# Patient Record
Sex: MALE | Race: WHITE
[De-identification: names, ages, dates, MRNs, and addresses within clinical notes are randomized per-mention and may not be internally consistent; named-entity substitution may affect disease eponyms.]

---

## 2018-01-07 ENCOUNTER — HOSPITAL ENCOUNTER (INPATIENT)
Dept: HOSPITAL 74 - JER | Age: 33
LOS: 4 days | Discharge: HOME | DRG: 872 | End: 2018-01-11
Attending: INTERNAL MEDICINE | Admitting: INTERNAL MEDICINE
Payer: COMMERCIAL

## 2018-01-07 VITALS — BODY MASS INDEX: 27.1 KG/M2

## 2018-01-07 DIAGNOSIS — M43.07: ICD-10-CM

## 2018-01-07 DIAGNOSIS — N45.3: ICD-10-CM

## 2018-01-07 DIAGNOSIS — N43.3: ICD-10-CM

## 2018-01-07 DIAGNOSIS — A41.9: Primary | ICD-10-CM

## 2018-01-07 DIAGNOSIS — I86.1: ICD-10-CM

## 2018-01-07 LAB
ALBUMIN SERPL-MCNC: 4.1 G/DL (ref 3.4–5)
ALP SERPL-CCNC: 70 U/L (ref 45–117)
ALT SERPL-CCNC: 39 U/L (ref 12–78)
ANION GAP SERPL CALC-SCNC: 11 MMOL/L (ref 8–16)
APPEARANCE UR: CLEAR
AST SERPL-CCNC: 26 U/L (ref 15–37)
BASOPHILS # BLD: 0.4 % (ref 0–2)
BILIRUB SERPL-MCNC: 0.7 MG/DL (ref 0.2–1)
BILIRUB UR STRIP.AUTO-MCNC: NEGATIVE MG/DL
BUN SERPL-MCNC: 19 MG/DL (ref 7–18)
CALCIUM SERPL-MCNC: 9 MG/DL (ref 8.5–10.1)
CHLORIDE SERPL-SCNC: 103 MMOL/L (ref 98–107)
CO2 SERPL-SCNC: 24 MMOL/L (ref 21–32)
COLOR UR: (no result)
CREAT SERPL-MCNC: 1.1 MG/DL (ref 0.7–1.3)
DEPRECATED RDW RBC AUTO: 12.6 % (ref 11.9–15.9)
EOSINOPHIL # BLD: 0.5 % (ref 0–4.5)
GLUCOSE SERPL-MCNC: 90 MG/DL (ref 74–106)
HCG UR QL: NEGATIVE
HCT VFR BLD CALC: 42.6 % (ref 35.4–49)
HGB BLD-MCNC: 14.3 GM/DL (ref 11.7–16.9)
KETONES UR QL STRIP: NEGATIVE
LEUKOCYTE ESTERASE UR QL STRIP.AUTO: NEGATIVE
LYMPHOCYTES # BLD: 14 % (ref 8–40)
MCH RBC QN AUTO: 30.6 PG (ref 25.7–33.7)
MCHC RBC AUTO-ENTMCNC: 33.5 G/DL (ref 32–35.9)
MCV RBC: 91.6 FL (ref 80–96)
MONOCYTES # BLD AUTO: 6.6 % (ref 3.8–10.2)
NEUTROPHILS # BLD: 78.5 % (ref 42.8–82.8)
NITRITE UR QL STRIP: NEGATIVE
PH UR: 5 [PH] (ref 5–8)
PLATELET # BLD AUTO: 329 K/MM3 (ref 134–434)
PMV BLD: 8.2 FL (ref 7.5–11.1)
POTASSIUM SERPLBLD-SCNC: 3.9 MMOL/L (ref 3.5–5.1)
PROT SERPL-MCNC: 7.9 G/DL (ref 6.4–8.2)
PROT UR QL STRIP: NEGATIVE
PROT UR QL STRIP: NEGATIVE
RBC # BLD AUTO: 4.65 M/MM3 (ref 4–5.6)
RBC # UR STRIP: NEGATIVE /UL
SODIUM SERPL-SCNC: 138 MMOL/L (ref 136–145)
SP GR UR: 1.02 (ref 1–1.03)
UROBILINOGEN UR STRIP-MCNC: NEGATIVE MG/DL (ref 0.2–1)
WBC # BLD AUTO: 13.1 K/MM3 (ref 4–10)

## 2018-01-07 PROCEDURE — G0378 HOSPITAL OBSERVATION PER HR: HCPCS

## 2018-01-07 NOTE — PDOC
History of Present Illness





- History of Present Illness


Initial Comments: 





01/07/18 20:11


The patient is a 32 year old male, with a significant past medical history of 

frequent UTIs, who presents to the emergency department with increased swelling 

to his left testicle despite completing a 10 day course of Ciprofloxacin 

yesterday for UTI diagnosed 12 days ago. The patient states the swelling is 

much worse now than it was when he first was seen 12 days ago for left groin 

pain and left testicular swelling. He denies recently sexual activities. He 

denies penile discharge. Secondarily, he mentions he has a urologist at [facility] 

for 15 years with a history of 5 prior CT scans for his frequent UTIs. He 

states he has been told he has prostatitis. 





He denies chest pain, shortness of breath, headache and dizziness. He denies 

fever, chills, nausea, vomit, diarrhea and constipation. He denies dysuria, 

frequency, urgency and hematuria. 





Allergies: NKDA


Urologist: Dr. Valentin Tomas ([phone])








<Lynette Philip - Last Filed: 01/07/18 22:49>





<Aundrea Granados - Last Filed: 01/08/18 00:39>





- General


Chief Complaint: Back Pain


Stated Complaint: PAIN


Time Seen by Provider: 01/07/18 19:23





Past History





<Lynette Philip - Last Filed: 01/07/18 22:49>





- Immunization History


Immunization Up to Date: Yes





- Suicide/Smoking/Psychosocial Hx


Smoking Status: No


Smoking History: Never smoked


Number of Cigarettes Smoked Daily: 0


Hx Alcohol Use: No (rarely)





<Aundrea Granados - Last Filed: 01/08/18 00:39>





- Past Medical History


Allergies/Adverse Reactions: 


 Allergies











Allergy/AdvReac Type Severity Reaction Status Date / Time


 


No Known Allergies Allergy   Verified 08/29/16 13:51











Home Medications: 


Ambulatory Orders





Sulfamethoxazole/Trimethoprim [Bactrim *Ds*] 1 each PO BID #14 tablet 08/29/16 


Doxycycline Hyclate 100 mg PO BID #14 capsule 01/07/18 











**Review of Systems





- Review of Systems


Able to Perform ROS?: Yes


Comments:: 


01/07/18 20:12





GENERAL/CONSTITUTIONAL: No fever or chills. No weakness.


HEAD, EYES, EARS, NOSE AND THROAT: No change in vision. No ear pain or 

discharge. No sore throat.


CARDIOVASCULAR: No chest pain or shortness of breath.


RESPIRATORY: No cough, wheezing, or hemoptysis.


GASTROINTESTINAL: No nausea, vomiting, diarrhea or constipation.


GENITOURINARY: (+) increased left testicular swelling. No dysuria, frequency, 

or change in urination. No discharge.


MUSCULOSKELETAL: No joint or muscle swelling or pain. No neck or back pain.


SKIN: No rash


NEUROLOGIC: No headache, vertigo, loss of consciousness, or change in strength/

sensation.


ENDOCRINE: No increased thirst. No abnormal weight change.


HEMATOLOGIC/LYMPHATIC: No anemia, easy bleeding, or history of blood clots.


ALLERGIC/IMMUNOLOGIC: No hives or skin allergy.








<Lynette Philip - Last Filed: 01/07/18 22:49>





*Physical Exam





- Vital Signs


 Last Vital Signs











Temp Pulse Resp BP Pulse Ox


 


 100.4 F H  95 H  20   131/72   99 


 


 01/07/18 19:10  01/07/18 19:10  01/07/18 19:10  01/07/18 19:10  01/07/18 19:10














- Physical Exam


Comments: 





01/07/18 20:12





GENERAL: Awake, alert, and fully oriented, in no acute distress


HEAD: No signs of trauma


EYES: PERRLA, EOMI, sclera anicteric, conjunctiva clear


ENT: Auricles normal inspection, hearing grossly normal, nares  patent, 

oropharynx clear without


exudates. Moist mucosa


NECK: Normal ROM, supple, no lymphadenopathy, JVD, or masses


LUNGS: Breath sounds equal, clear to auscultation bilaterally.  No wheezes, and 

no crackles


HEART: Regular rate and rhythm, normal S1 and S2, no murmurs, rubs or gallops


ABDOMEN: Soft, nontender, normoactive bowel sounds.  No guarding, no rebound.  

No masses


GENITAL: (+) left epididymis 2x size of the right testicle, left testicle is 3x 

size of right testicle. Left testicle is tender, warm and erythematous. No 

inguinal hernia appreciated. 


EXTREMITIES: Normal range of motion, no edema.  No clubbing or cyanosis. No 

cords, erythema, or tenderness


NEUROLOGICAL: Cranial nerves II through XII grossly intact.  Normal speech, 

normal gait


SKIN: Warm, Dry, normal turgor, no rashes or lesions noted.








<Lynette Philip - Last Filed: 01/07/18 22:49>





- Vital Signs


 Last Vital Signs











Temp Pulse Resp BP Pulse Ox


 


 100.4 F H  95 H  20   131/72   99 


 


 01/07/18 19:10  01/07/18 19:10  01/07/18 19:10  01/07/18 19:10  01/07/18 19:10














<Aundrea Granados - Last Filed: 01/08/18 00:39>





ED Treatment Course





- LABORATORY


CBC & Chemistry Diagram: 


 01/07/18 20:17





 01/07/18 20:17





- ADDITIONAL ORDERS


Additional order review: 


 Laboratory  Results











  01/07/18





  19:41


 


Urine HCG, Qual  Negative














- RADIOLOGY


Radiograph Interpretation: 





EXAM: CT abdomen \T\ pelvis without contrast


HISTORY: Rule out kidney stone


COMPARISON: None.


FINDINGS:


1. No evidence of an acute intra-abdominal process.


No evidence of hydronephrosis or urinary tract calculi.


No evidence of bowel obstruction, pneumoperitoneum or free fluid.


No evidence of appendicitis.


2. Marked degenerative disc and endplate change L5-S1 level with bilateral L5 

spondylolysis.





Andra Briggs MD


01/07/2018 22:26 EST





________________________________________________________________________________





EXAM: Ultrasound scrotum


HISTORY: Swelling on left


COMPARISON: None.


FINDINGS:


1. Edema and increased flow in the left epididymis consistent with left 

epididymitis.


2. Small complex left hydrocele with septations.


3. No definite evidence of orchitis and no evidence of testicular torsion.





Andra Briggs MD


01/07/2018 22:28 EST








- Medications


Given in the ED: 


ED Medications














Discontinued Medications














Generic Name Dose Route Start Last Admin





  Trade Name Freq  PRN Reason Stop Dose Admin


 


Ibuprofen  600 mg  01/07/18 19:28  01/07/18 19:56





  Motrin -  PO  01/07/18 19:29  600 mg





  ONCE ONE   Administration














<Lynette Philip - Last Filed: 01/07/18 22:49>





- LABORATORY


CBC & Chemistry Diagram: 


 01/07/18 20:17





 01/07/18 20:17





<Aundrea Granados - Last Filed: 01/08/18 00:39>





Medical Decision Making





- Medical Decision Making


01/07/18 22:45


Dr. Valentin Tomas, [facility] Urologist, was paged via phone answering service at 

this time requesting a call back for doctor to doctor consult. 





<Lynette Philip - Last Filed: 01/07/18 22:49>





- Medical Decision Making





01/07/18 19:58


Pt has worsening swelling of his left scrotum. He has a  fever.  He was 

diagnosed at [facility] with a UTI he was treated with a shot and cipro pills.


01/07/18 22:16


CXR is normal.


UA is normal; 


Pt has an elevated WBC count.


Sono pending.


01/07/18 23:12


Case d/w Dr. Tomas who recommends admission vs a dose of ampicillin and 

gentamicin IVPB now; home with doxy BID and follow in the office tomorrrow


01/08/18 00:16


Pt figured out that he completed a 10 day course of doxycycline and a 7 day 

course of cipro just a couple montoya back completed both. Given that he just 

completed a course of oral doxy, I feel that sending raúl out with doxy will not 

be helpful.


I will discuss with our urologist on call and admit patient to the hospitalist 

team for scrotal cellulitis/epididymitis/septated varicocele;


01/08/18 00:21


Dr. Glez urology agrees and will see the patient tomorrow.


I will admit to hospitalist.





<Aundrea Granados - Last Filed: 01/08/18 00:39>





*DC/Admit/Observation/Transfer





- Attestations


Scribe Attestion: 





01/07/18 20:13





Documentation prepared by Lynette Philip, acting as medical scribe for Aundrea Granados MD





<Lynette Philip - Last Filed: 01/07/18 22:49>





- Discharge Dispostion


Admit: Yes





<Aundrea Granados - Last Filed: 01/08/18 00:39>


Diagnosis at time of Disposition: 


 Fever, Epididymitis, Varicocele, Cellulitis of scrotum








- Discharge Dispostion


Condition at time of disposition: Guarded





- Prescriptions


Prescriptions: 


Doxycycline Hyclate 100 mg PO BID #14 capsule





- Referrals


Referrals: 


Rajeev Alcantara MD [Primary Care Provider] - 





- Patient Instructions





- Post Discharge Activity

## 2018-01-08 LAB
ALBUMIN SERPL-MCNC: 3.7 G/DL (ref 3.4–5)
ALP SERPL-CCNC: 62 U/L (ref 45–117)
ALT SERPL-CCNC: 35 U/L (ref 12–78)
ANION GAP SERPL CALC-SCNC: 7 MMOL/L (ref 8–16)
AST SERPL-CCNC: 24 U/L (ref 15–37)
BASOPHILS # BLD: 0.4 % (ref 0–2)
BILIRUB SERPL-MCNC: 1.4 MG/DL (ref 0.2–1)
BUN SERPL-MCNC: 13 MG/DL (ref 7–18)
CALCIUM SERPL-MCNC: 8.3 MG/DL (ref 8.5–10.1)
CHLORIDE SERPL-SCNC: 105 MMOL/L (ref 98–107)
CO2 SERPL-SCNC: 26 MMOL/L (ref 21–32)
CREAT SERPL-MCNC: 1 MG/DL (ref 0.7–1.3)
DEPRECATED RDW RBC AUTO: 12.3 % (ref 11.9–15.9)
EOSINOPHIL # BLD: 0.1 % (ref 0–4.5)
GLUCOSE SERPL-MCNC: 85 MG/DL (ref 74–106)
HCT VFR BLD CALC: 41 % (ref 35.4–49)
HGB BLD-MCNC: 13.6 GM/DL (ref 11.7–16.9)
LYMPHOCYTES # BLD: 12 % (ref 8–40)
MCH RBC QN AUTO: 30.4 PG (ref 25.7–33.7)
MCHC RBC AUTO-ENTMCNC: 33.2 G/DL (ref 32–35.9)
MCV RBC: 91.5 FL (ref 80–96)
MONOCYTES # BLD AUTO: 8.6 % (ref 3.8–10.2)
NEUTROPHILS # BLD: 78.9 % (ref 42.8–82.8)
PLATELET # BLD AUTO: 318 K/MM3 (ref 134–434)
PMV BLD: 8.3 FL (ref 7.5–11.1)
POTASSIUM SERPLBLD-SCNC: 4.1 MMOL/L (ref 3.5–5.1)
PROT SERPL-MCNC: 7.2 G/DL (ref 6.4–8.2)
RBC # BLD AUTO: 4.48 M/MM3 (ref 4–5.6)
SODIUM SERPL-SCNC: 138 MMOL/L (ref 136–145)
WBC # BLD AUTO: 14.3 K/MM3 (ref 4–10)

## 2018-01-08 RX ADMIN — SODIUM CHLORIDE SCH MLS/HR: 9 INJECTION, SOLUTION INTRAVENOUS at 21:01

## 2018-01-08 RX ADMIN — CEFEPIME HYDROCHLORIDE SCH MLS/HR: 2 INJECTION, POWDER, FOR SOLUTION INTRAVENOUS at 18:39

## 2018-01-08 RX ADMIN — SODIUM CHLORIDE SCH MLS/HR: 9 INJECTION, SOLUTION INTRAVENOUS at 04:37

## 2018-01-08 RX ADMIN — ACETAMINOPHEN PRN MG: 325 TABLET ORAL at 17:34

## 2018-01-08 RX ADMIN — CEFEPIME HYDROCHLORIDE SCH MLS/HR: 2 INJECTION, POWDER, FOR SOLUTION INTRAVENOUS at 11:53

## 2018-01-08 RX ADMIN — ACETAMINOPHEN PRN MG: 325 TABLET ORAL at 10:02

## 2018-01-08 NOTE — PN
Progress Note (short form)





- Note


Progress Note: 





ID Consult dictated


Acute L epididymitis


Hx chronic recurrent UTIs


Fever/ Leukocytosis R/O sepsis secondary to  source


Await cultures


Obtain urine c/s result from outpatient visit


Empiric cefepime 2gm IVPB q8h


Urology evaluation


Quantiferon

## 2018-01-08 NOTE — CONS
DATE OF CONSULTATION:  

 

CHIEF COMPLAINT:  Patient evaluated for acute left epididymis.

 

HISTORY:  The patient has a long and complicated urological history.  He reports

having epididymitis at age 9.  For the past 10 years, he has been under the care of

a urologist at [place] for recurrent urinary tract infections.  He reports extensive

workup including several CAT scans.  Cystoscopies have been negative for underlying

anatomical defect or etiology for his recurrent urinary tract infections.  For the

past 1 year, he has been seeing a urologist at [facility].  Approximately 2 weeks ago,

he developed left testicular pain and swelling.  He was evaluated in the urgent care

center.  He reports that cultures were obtained.  He was empirically prescribed

ciprofloxacin and doxycycline.  Despite taking the ciprofloxacin for 7 days and

doxycycline for 10 days, he has had increased pain and swelling of the left scrotal

area.  He presented to the emergency room where he was noted to have low-grade fever

and an elevated white blood cell count.  Cultures were obtained.  He was empirically

treated with IV antibiotics.  A sonogram of the scrotum was performed and showed a

left epididymitis with a complex left hydrocele.  No evidence of orchitis was noted. 

The patient lives at home with his wife.  He is sexually active with her.  He is

monogamous.  Does not use condoms.  He has no history of sexually transmitted

diseases.  He was [place] in the [place] and has been a lifelong resident.  No history of

TB exposure.  The patient denies any dysuria or hematuria at the present time.  He

did not have fever or chills at home, although he was febrile in the emergency room. 

He reports that his urine cultures have consistently grown Escherichia coli.  The

only culture result available to us at this time is a urine culture from 2015, which

showed an ampicillin and quinolone-resistant Escherichia coli.

 

PAST MEDICAL HISTORY:  As above includes frequent urinary tract infections and a

history of prostatitis.

 

PAST SURGICAL HISTORY:  Negative.

 

ALLERGIES:  No known allergies.

 

SOCIAL HISTORY:  He is .  Lives at home with his wife.  Monogamous.  He denies

tobacco, alcohol, or illicit drugs.  He works as a doorman.  

 

SYSTEMS REVIEW:

Neurologic:  No loss of consciousness, seizure activity, or focal weakness.

Cardiac:  Negative chest pain or palpitations.

Respiratory:  Negative cough or sputum production.  

Gastrointestinal:  Negative vomiting or diarrhea.

Genitourinary:  As per HPI.  

 

LABORATORY DATA:  White count 14.3, hematocrit 41, platelet count 318, differential

78 neutrophils, 12 lymphocytes, 8 monocytes, BUN 13, creatinine 1.  Urine:  Leukocyte

esterase negative.  Urine culture pending.  Total bilirubin 1.4, alkaline phosphatase

62, AST 24.  GC and Chlamydia probe pending.

 

PHYSICAL EXAMINATION: 

General:  He is awake and alert in no acute distress.  

Vital Signs:  Temperature 99.4, T-max 100.4, blood pressure 112/68, pulse 99 and

regular, respirations 20 per minute.

HEENT:  Sclerae anicteric.  Dry mucous membranes.

Neck:  Supple.

Heart:  Sounds S1, S2.  

Lungs:  Clear. 

Abdomen:  Soft.  No tenderness elicited.  No suprapubic or flank tenderness. 

Extremities:  Negative for edema.

Genitalia:  Examination of the genital area, there is marked swelling of the left

hemiscrotum with erythema.  There is an exquisitely tender, swollen epididymis.  No

penile lesions noted.  Positive small inguinal adenopathy.

 

IMPRESSION: 

1.  Acute left epididymitis.

2.  History of chronic, recurrent urinary tract infections with Escherichia coli.

3.  Fever, leukocytosis, possible sepsis secondary to genitourinary source.

 

PLAN:  Await blood and urine culture results.  Obtain most recent urine culture

result from primary provider done last week.  Empiric antibiotic coverage for

possible quinolone and ampicillin-resistant Escherichia coli with cefepime 2 mg IV

piggyback every 8 hours.  Obtain QuantiFeron.  Urology evaluation.  Case discussed

with the patient's wife present at the time of examination.

 

Thank you for the kind referral.

 

 

 

 

SHELLEY ELKINS M.D.

 

[id number]

DD: 01/08/2018 11:27

DT: 01/08/2018 12:52

Job #:  [id number]

## 2018-01-08 NOTE — CON.GU
Consult


Consult Specialty:: 


Referred by:: Mary


Reason for Consultation:: L epididymitis





- History of Present Illness


Chief Complaint: L testicular swelling and pain


History of Present Illness: 





33 yo m w hx rec UTIs and L epididymitis at age 9, s/p extensive w/u in the 

past who was seen at [facility] 2 weeks ago for same dxd w acute L epididymitis rx 

w cipro and doxy however his pain and swelling persisted and he pres to ED. He 

was found to have fever, elevated WBC count and hot swollen tender L 

hemiscrotum and  cons req.





- History Source


History Provided By: Patient, Medical Record


Limitations to Obtaining History: No Limitations





- Past Medical History


Renal/: Yes: UTI (L epididymitis)





- Alcohol/Substance Use


Hx Alcohol Use: No (rarely)





- Smoking History


Smoking history: Never smoked


Aproximately how many cigarettes per day: 0





Home Medications





- Allergies


Allergies/Adverse Reactions: 


 Allergies











Allergy/AdvReac Type Severity Reaction Status Date / Time


 


No Known Allergies Allergy   Verified 08/29/16 13:51














- Home Medications


Home Medications: 


Ambulatory Orders





Doxycycline Hyclate 100 mg PO BID 01/08/18 











Review of Systems





- Review of Systems


Constitutional: reports: Fever


Genitourinary: reports: Testicular Swelling





Physical Exam-


Vital Signs: 


 Vital Signs











Temperature  98.4 F   01/08/18 12:00


 


Pulse Rate  93 H  01/08/18 12:00


 


Respiratory Rate  17   01/08/18 12:00


 


Blood Pressure  99/58   01/08/18 12:00


 


O2 Sat by Pulse Oximetry (%)  99   01/08/18 09:00











Gastrointestinal: Yes: Normal Bowel Sounds, Soft


Renal/: Yes: WNL


Testicles: Yes: Tenderness


Scrotum: Yes: Hydrocele, Tenderness (enlarged, tender L epididymis w erythema)


Labs: 


 CBC, BMP





 01/08/18 05:00 





 01/08/18 05:00 











Imaging





- Results


Cat Scan: Report Reviewed


Ultrasound: Report Reviewed





Problem List





- Problems


(1) Epididymitis


Code(s): N45.1 - EPIDIDYMITIS   





(2) Hydrocele


Code(s): N43.3 - HYDROCELE, UNSPECIFIED   





Assessment/Plan


Imp: acute L epididymitis, hx rec UTI


Rec: urine c+s, IV abxs, f/u in office after disch, consider bilat vasectomy.

## 2018-01-08 NOTE — PN
Teaching Attending Note


Name of Resident: Kevin Lim





ATTENDING PHYSICIAN STATEMENT





I saw and evaluated the patient.


I reviewed the resident's note and discussed the case with the resident.


I agree with the resident's findings and plan as documented.








SUBJECTIVE:


32 M with hx. of UTI's (E-Coli), who presents with scrotal edema, failed 

outpatient for  possibly UTI's. States he was oN Cipro X 10 days. Also, 

completed a course of Docycycline. Pt. states he noted scortal edema and 

erythema, no fevers at home, exept for with UTI. Also, has pain with scrotal 

edema.








OBJECTIVE:


Physical:


VS:


 Vital Signs











 Period  Temp  Pulse  Resp  BP Sys/Jeffers  Pulse Ox


 


 Last 24 Hr  100.4 F  95  20  131/72  99








GEN: NAd, resting in bed, AA0X3


HEENT: NCAT, PERRL, throat without erythema or exudates


CARD: RRR S1, S2


RESP: CTAB


ABD: BSx4, NTD to palpation


EXT: - C/C/E





 CBCD











WBC  13.1 K/mm3 (4.0-10.0)  H D 01/07/18  20:17    


 


RBC  4.65 M/mm3 (4.00-5.60)   01/07/18  20:17    


 


Hgb  14.3 GM/dL (11.7-16.9)   01/07/18  20:17    


 


Hct  42.6 % (35.4-49)   01/07/18  20:17    


 


MCV  91.6 fl (80-96)   01/07/18  20:17    


 


MCHC  33.5 g/dl (32.0-35.9)   01/07/18  20:17    


 


RDW  12.6 % (11.9-15.9)   01/07/18  20:17    


 


Plt Count  329 K/MM3 (134-434)  D 01/07/18  20:17    


 


MPV  8.2 fl (7.5-11.1)   01/07/18  20:17    








 CMP











Sodium  138 mmol/L (136-145)   01/07/18  20:17    


 


Potassium  3.9 mmol/L (3.5-5.1)   01/07/18  20:17    


 


Chloride  103 mmol/L ()   01/07/18  20:17    


 


Carbon Dioxide  24 mmol/L (21-32)   01/07/18  20:17    


 


Anion Gap  11  (8-16)   01/07/18  20:17    


 


BUN  19 mg/dL (7-18)  H D 01/07/18  20:17    


 


Creatinine  1.1 mg/dL (0.7-1.3)  D 01/07/18  20:17    


 


Creat Clearance w eGFR  > 60  (>60)   01/07/18  20:17    


 


Random Glucose  90 mg/dL ()   01/07/18  20:17    


 


Calcium  9.0 mg/dL (8.5-10.1)   01/07/18  20:17    


 


Total Bilirubin  0.7 mg/dL (0.2-1.0)  D 01/07/18  20:17    


 


AST  26 U/L (15-37)  D 01/07/18  20:17    


 


ALT  39 U/L (12-78)  D 01/07/18  20:17    


 


Alkaline Phosphatase  70 U/L ()  D 01/07/18  20:17    


 


Total Protein  7.9 g/dl (6.4-8.2)   01/07/18  20:17    


 


Albumin  4.1 g/dl (3.4-5.0)   01/07/18  20:17    








 Urine Test Results











Urine Color  Ltyellow   01/07/18  19:41    


 


Urine Appearance  Clear   01/07/18  19:41    


 


Urine pH  5.0  (5.0-8.0)   01/07/18  19:41    


 


Ur Specific Gravity  1.018  (1.001-1.035)   01/07/18  19:41    


 


Urine Protein  Negative  (NEGATIVE)   01/07/18  19:41    


 


Urine Glucose (UA)  Negative  (NEGATIVE)   01/07/18  19:41    


 


Urine Ketones  Negative  (NEGATIVE)   01/07/18  19:41    


 


Urine Blood  Negative  (NEGATIVE)   01/07/18  19:41    


 


Urine Nitrite  Negative  (NEGATIVE)   01/07/18  19:41    


 


Urine Bilirubin  Negative  (NEGATIVE)   01/07/18  19:41    


 


Ur Leukocyte Esterase  Negative  (NEGATIVE)   01/07/18  19:41    

















EXAM: CT abdomen \T\ pelvis without contrast


HISTORY: Rule out kidney stone


COMPARISON: None.


FINDINGS:


1. No evidence of an acute intra-abdominal process.


No evidence of hydronephrosis or urinary tract calculi.


No evidence of bowel obstruction, pneumoperitoneum or free fluid.


No evidence of appendicitis.


2. Marked degenerative disc and endplate change L5-S1 level with bilateral L5 

spondylolysis.





Andra Briggs MD


01/07/2018 22:26 EST





________________________________________________________________________________





EXAM: Ultrasound scrotum


HISTORY: Swelling on left


COMPARISON: None.


FINDINGS:


1. Edema and increased flow in the left epididymis consistent with left 

epididymitis.


2. Small complex left hydrocele with septations.


3. No definite evidence of orchitis and no evidence of testicular torsion.


ASSESSMENT AND PLAN:


32 M with hx. of UTI's (E-Coli), who presents with scrotal edema, failed 

outpatient for  possibly UTI's. States he was oN Cipro X 10 days. Also, 

completed a course of Docycycline, being admitted for Sepsis due to epididymitis

/scrotal cellulitis.





1.) Scotal Cellulitis/Epididymitis/Hydrocele


- Pan Cx


- LA


- IVF


- S/P Gentamycin and Ampicillin as per , will get ID consult


-  consulted


- GC screen


- HIV test if pt. amenable





2.) DVT Ppx


- Scds





Place in Med-Sx

## 2018-01-08 NOTE — PN
<Nancy King - Last Filed: 01/08/18 18:10>


Physical Exam: 


SUBJECTIVE: Patient seen and examined. C/o L testicle pain/swelling. No fever 

or chills.








OBJECTIVE:





 Vital Signs











 Period  Temp  Pulse  Resp  BP Sys/Jeffers  Pulse Ox


 


 Last 24 Hr  98.4 F-100.4 F  93-99  17-20  /58-72  99-99











GENERAL: nad, aaox3


EYES: sclera anicteric, conjunctiva clear 


ENT: oropharynx clear without exudates, MMM


NECK: supple, no cervial LAD


LUNGS: CTAB


HEART: rrr, normal s1/s2


ABDOMEN: Soft, ntnd, 


LOWER EXTREMITIES: 2+ DP pulses, wwp, no edema


: no suprapubic ttp, enlarged, tender L epididymis w erythema, no penile 

discharge


 


               


 01/08/18 05:00 





 01/08/18 05:00 





 Hepatic Panel











Total Bilirubin  1.4 mg/dL (0.2-1.0)  H D 01/08/18  05:00    


 


AST  24 U/L (15-37)   01/08/18  05:00    


 


ALT  35 U/L (12-78)   01/08/18  05:00    


 


Alkaline Phosphatase  62 U/L ()   01/08/18  05:00    


 


Albumin  3.7 g/dl (3.4-5.0)   01/08/18  05:00    








 Urine Test Results











Urine Color  Ltyellow   01/07/18  19:41    


 


Urine Appearance  Clear   01/07/18  19:41    


 


Urine pH  5.0  (5.0-8.0)   01/07/18  19:41    


 


Ur Specific Gravity  1.018  (1.001-1.035)   01/07/18  19:41    


 


Urine Protein  Negative  (NEGATIVE)   01/07/18  19:41    


 


Urine Glucose (UA)  Negative  (NEGATIVE)   01/07/18  19:41    


 


Urine Ketones  Negative  (NEGATIVE)   01/07/18  19:41    


 


Urine Blood  Negative  (NEGATIVE)   01/07/18  19:41    


 


Urine Nitrite  Negative  (NEGATIVE)   01/07/18  19:41    


 


Urine Bilirubin  Negative  (NEGATIVE)   01/07/18  19:41    


 


Ur Leukocyte Esterase  Negative  (NEGATIVE)   01/07/18  19:41    








IMAGING:


CT A/P w/o contrast 1/7/18: The visualized lung base appears unremarkable and 

the heart is within normal limits in size. There is mild fluid distention of 

the distal esophagus. Evaluation of the liver, spleen, pancreas, gallbladder, 

both adrenal glands and both kidneys appear unremarkable. Both kidneys appear 

unremarkable without evidence of hydroureteronephrosis, renal or ureteral stone 

, bilaterally. The stomach is partially distended without wall thickening. 

There is no evidence of small bowel obstruction. Normal-appearing terminal 

ileum. Nonvisualization of the appendix. Moderate amount of fecal residue in 

the colon without wall thickening. Partially distended urinary bladder without 

wall thickening. Normal size prostate gland. Perirectal and pericecal fat are 

clear. There is moderate to marked degenerative disc disease at L5-S1 level 

with bilateral spondylolysis and moderate bilateral facet hypertrophy. Mild 

broad-based disc bulge is slightly narrowing the foramina reaching and probably 

slightly impinging right L5 nerve root IMPRESSION: Nonvisualization of the 

appendix. No CT evidence of an acute process in the abdomen and pelvis. 

Partially distended urinary bladder limiting evaluation of its wall without 

gross thickening. Moderately severe degenerative disc disease at L5-S1 level 

with irregularity of the corresponding endplates, bilateral spondylolysis and 

mild disc bulge likely impinging right L5 nerve root.





Scrotal ultrasound 1/7/18: The right testicle measures 5 x 2.6 cm with a 

homogeneous echotexture and normal vascular flow. The right epididymal head, 

body and tail measure 7, 4 and 7 mm, respectively. Left testicle measures 4.5 x 

3.6 cm with homogeneous echotexture. Arterial and venous flow was documented. 

The left epididymal head, body and tail measure 7, 7 and 12 mm, respectively. 

There is a small amount of fluid/hydrocele on the left with septations. On the 

color Doppler images, there is prominent vascular flow in the left testicle and 

epididymis relative to the right. There is no evidence of a varicocele 

IMPRESSION: Hypervascular left testicle and prominent hypervascular left 

epididymis when compared to the right consistent with left epididymoorchitis. A 

small left hydrocele with thin septations is present 








ASSESSMENT/PLAN:


31yo man with PMH of recurrent UTIs who p/w 1x day of L testicular pain and 

swelling one day after completing a course of Cipro and doxy for UTI diagnosed 

on 12/27 now with L epididymitis





#Sepsis 2/2 L epididymitis - WBC 13.1, fever 100.4 on admission; scotal U/S e/o 

L epididymitis/ small septated hydrococele


[facility] Records in patient's chart: 12/27/17 UCx grew Serratia marescens, 

sensitive to Cefepime


-ID consulted.Started on Cefepime 2gm IVPB q8h


-f/u Urine cultures


-f/u GC/Chlamydia and Quantiferon 


- consulted


-tylenol prn for pain control 





#DVT PPX - SCDs





#FEN: NS 100cc/hr / lytes wnl / Regular diet





#DISPO: continue M/S


FULL code





d/w Dr. Adriane King MD


PGY1 - Internal Medicine





Visit type





- Emergency Visit


Emergency Visit: No





- New Patient


This patient is new to me today: Yes


Date on this admission: 01/08/18





- Critical Care


Critical Care patient: No





<Jose Carlos Forrest - Last Filed: 01/08/18 19:54>


Physical Exam: 


SUBJECTIVE: Patient seen and examined. Agree with the resident's note

## 2018-01-08 NOTE — HP
CHIEF COMPLAINT: L scrotal swelling, pain





PCP: None





HISTORY OF PRESENT ILLNESS:


33 yo man w/ pmh of multiple UTIs presents to ED with increased swelling/pain 

of L testicle in the setting of recent 10 day abx course (ciprofloxacin) for 

UTI diagnosed 12 days ago. Pt states that he initially presented to urgent care 

two weeks ago for fever/dysuria, where he was diagnosed with UTI and started on 

10 day course of ciprofloxacin. Yesterday, pt noted increased swelling and pain 

in L testicle, w/ no penile discharge, inguinal masses or local erythema. Pt 

denies HA/Dizziness, fever/chills, CP, SOB, abdominal pain, N/V, diarrhea, 

rashes or focal neurologic deficits. He denies any recent dysuria, hematuria, 

frequency. He denies any recent sexual activity and has no prior hx of STDs. Pt 

sexually active and monogamous with wife, who has never been diagnosed with STD 

per pt, and does not use condoms. Pt states he had a similar episode of scrotal 

swelling around the age of 8, but is unable to provide further details of the 

hx or course. No hx of hernias. 





Pt follows w/ Dr. Garza at [facility] for last year and at [facility] for prior 10 years 

for his chronic, recurrent UTIs and has received multiple CT scans for 

evaluation, as well as cystoscopy, which was normal per pt. Per pt, Dr. garza 

has a suspicion of possible chronic prostatis as the possible source for 

recurrent infections. All work-up prior to this presentation has been 

inconclusive. 





Urologist: Dr. Valentin Garza ([phone])





ER course was notable for:


(1)WBC 13, Fever 100.4 


(2)Scrotal U/S w/ hydrocele and evidence of epididymitis


(3)CT w/ no acute intra-abdominal process


(4) Received 1x dose Amp/gent, doxy and rocephin in ED





Recent Travel:


None





PAST MEDICAL HISTORY:


Recurrent UTIs


?Prostatitis 





PAST SURGICAL HISTORY:


None





Social History:


Smoking: Denies


Alcohol: Denies


Drugs: Denies





Family History:


No family hx of UTIs, recurrent infections or other chronic conditions





Allergies





No Known Allergies Allergy (Verified 08/29/16 13:51)


 








HOME MEDICATIONS:


 Home Medications











 Medication  Instructions  Recorded


 


Sulfamethoxazole/Trimethoprim 1 each PO BID #14 tablet 08/29/16





[Bactrim *Ds*]  


 


Doxycycline Hyclate 100 mg PO BID #14 capsule 01/07/18








REVIEW OF SYSTEMS


CONSTITUTIONAL: fever, 


Absent:  chills, diaphoresis, generalized weakness, malaise, loss of appetite, 

weight change


HEENT: 


Absent:  rhinorrhea, nasal congestion, throat pain, throat swelling, difficulty 

swallowing, mouth swelling, ear pain, eye pain, visual changes


CARDIOVASCULAR: 


Absent: chest pain, syncope, palpitations, irregular heart rate, lightheadedness

, peripheral edema


RESPIRATORY: 


Absent: cough, shortness of breath, dyspnea with exertion, orthopnea, wheezing, 

stridor, hemoptysis


GASTROINTESTINAL:


Absent: abdominal pain, abdominal distension, nausea, vomiting, diarrhea, 

constipation, melena, hematochezia


GENITOURINARY: L scrotal swelling, pain


Absent: dysuria, frequency, urgency, hesitancy, hematuria, flank pain 


MUSCULOSKELETAL: 


Absent: myalgia, arthralgia, joint swelling, neck pain


SKIN: 


Absent: rash, itching, pallor


HEMATOLOGIC/IMMUNOLOGIC: 


Absent: easy bleeding, easy bruising, lymphadenopathy, frequent infections


ENDOCRINE:


Absent: unexplained weight gain, unexplained weight loss, heat intolerance, 

cold intolerance


NEUROLOGIC: 


Absent: headache, focal weakness or paresthesias, dizziness, unsteady gait, 

seizure, mental status changes, bladder or bowel incontinence


PSYCHIATRIC: 


Absent: anxiety, depression, suicidal or homicidal ideation, hallucinations.








PHYSICAL EXAMINATION


 Vital Signs - 24 hr











  01/07/18





  19:10


 


Temperature 100.4 F H


 


Pulse Rate 95 H


 


Respiratory 20





Rate 


 


Blood Pressure 131/72


 


O2 Sat by Pulse 99





Oximetry (%) 











GENERAL: Awake, alert, and fully oriented, in no acute distress.


HEAD: Normal with no signs of trauma.


EYES: Pupils equal, round and reactive to light, extraocular movements intact, 

sclera anicteric, conjunctiva clear. No lid lag.


EARS, NOSE, THROAT: Ears normal, nares patent, oropharynx clear without 

exudates. Moist mucous membranes.


NECK: Normal range of motion, supple without lymphadenopathy, JVD, or masses.


LUNGS: Breath sounds equal, clear to auscultation bilaterally. No wheezes, and 

no crackles. No accessory muscle use.


HEART: Regular rate and rhythm, normal S1 and S2 without murmur, rub or gallop.


ABDOMEN: Soft, nontender, not distended, normoactive bowel sounds, no guarding, 

no rebound, no masses.  No hepatomegaly or  splenomegaly. 


MUSCULOSKELETAL: Normal range of motion at all joints. No bony deformities or 

tenderness. No CVA tenderness.


UPPER EXTREMITIES: 2+ pulses, warm, well-perfused. No cyanosis. No clubbing. No 

peripheral edema.


LOWER EXTREMITIES: 2+ pulses, warm, well-perfused. No calf tenderness. No 

peripheral edema. 


NEUROLOGICAL:  Cranial nerves II-XII intact. Normal speech. Normal gait.


PSYCHIATRIC: Cooperative. Good eye contact. Appropriate mood and affect.


SKIN: Warm, dry, normal turgor, no rashes or lesions noted, normal capillary 

refill. 


Genital: L testicle significantly enlarged, very tender to palpation when 

compared to R. Prominent, engorged epididymis. No inguinal hernia noted. No 

notable erythema, lesions, rashes or purulence. 





 Laboratory Results - last 24 hr


 CBC, BMP





 01/07/18 20:17 





 01/07/18 20:17 














  01/07/18 01/07/18 01/07/18





  19:41 20:17 20:17


 


WBC   13.1 H D 


 


RBC   4.65 


 


Hgb   14.3 


 


Hct   42.6 


 


MCV   91.6 


 


MCH   30.6 


 


MCHC   33.5 


 


RDW   12.6 


 


Plt Count   329  D 


 


MPV   8.2 


 


Neutrophils %   78.5 


 


Lymphocytes %   14.0 


 


Monocytes %   6.6 


 


Eosinophils %   0.5  D 


 


Basophils %   0.4 


 


Sodium    138


 


Potassium    3.9


 


Chloride    103


 


Carbon Dioxide    24


 


Anion Gap    11


 


BUN    19 H D


 


Creatinine    1.1  D


 


Creat Clearance w eGFR    > 60


 


Random Glucose    90


 


Calcium    9.0


 


Total Bilirubin    0.7  D


 


AST    26  D


 


ALT    39  D


 


Alkaline Phosphatase    70  D


 


Total Protein    7.9


 


Albumin    4.1


 


Urine Color  Ltyellow  


 


Urine Appearance  Clear  


 


Urine pH  5.0  


 


Ur Specific Gravity  1.018  


 


Urine Protein  Negative  


 


Urine Glucose (UA)  Negative  


 


Urine Ketones  Negative  


 


Urine Blood  Negative  


 


Urine Nitrite  Negative  


 


Urine Bilirubin  Negative  


 


Urine Urobilinogen  Negative  


 


Ur Leukocyte Esterase  Negative  


 


Urine HCG, Qual  Negative  











CT abdomen/pelvis 1/8/18 - 1. No evidence of an acute intra-abdominal process.


No evidence of hydronephrosis or urinary tract calculi.


No evidence of bowel obstruction, pneumoperitoneum or free fluid.


No evidence of appendicitis.


2. Marked degenerative disc and endplate change L5-S1 level with bilateral L5 

spondylolysis.





Scrotal U/S - 1. Edema and increased flow in the left epididymis consistent 

with left epididymitis.


2. Small complex left hydrocele with septations.


3. No definite evidence of orchitis and no evidence of testicular torsion.





CXR - No pathology noted





ASSESSMENT/PLAN:


33 yo man w/ pmh of multiple UTIs presents to ED with increased swelling/pain 

of L testicle in the setting of recent 10 day abx course (ciprofloxacin) for 

UTI diagnosed 12 days ago.





#Sepsis secondary to epididymitis/testicular cellulitis - WBC 13.1, fever 100.4 

on admission; Testicular U/s with evidence of epididymitis/septated varicocele


- ID consulted


- Trend WBC, fever


- Trend lactate


- Amp/gent for abx coverage; defer to ID recs


   - Ampicilin 500mg q6h for  infections


   - Gentamicin 3mg/kg/day in divided doses q8h


- GC/Chlamydia panel 


- HIV test if pt amenable


-  consulted


- f/u all cultures


- IVFs


-  consulted, Dr. Glez to see pt in AM


-tylenol for pain control 





PPX


SCDs





FEN


NS 100cc/hr


Daily BMPs; no electrolyte abnormalities


Regular diet





Plan discussed with attending, Dr. Mary Lim, PGY1





Visit type





- Emergency Visit


Emergency Visit: Yes


ED Registration Date: 01/08/18


Care time: The patient presented to the Emergency Department on the above date 

and was hospitalized for further evaluation of their emergent condition.





- New Patient


This patient is new to me today: Yes


Date on this admission: 01/08/18





- Critical Care


Critical Care patient: No

## 2018-01-09 LAB
ALBUMIN SERPL-MCNC: 3.4 G/DL (ref 3.4–5)
ALP SERPL-CCNC: 60 U/L (ref 45–117)
ALT SERPL-CCNC: 27 U/L (ref 12–78)
ANION GAP SERPL CALC-SCNC: 10 MMOL/L (ref 8–16)
AST SERPL-CCNC: 16 U/L (ref 15–37)
BILIRUB SERPL-MCNC: 1.2 MG/DL (ref 0.2–1)
BUN SERPL-MCNC: 11 MG/DL (ref 7–18)
CALCIUM SERPL-MCNC: 8.8 MG/DL (ref 8.5–10.1)
CHLORIDE SERPL-SCNC: 106 MMOL/L (ref 98–107)
CO2 SERPL-SCNC: 25 MMOL/L (ref 21–32)
CREAT SERPL-MCNC: 1.1 MG/DL (ref 0.7–1.3)
DEPRECATED RDW RBC AUTO: 12.6 % (ref 11.9–15.9)
GLUCOSE SERPL-MCNC: 82 MG/DL (ref 74–106)
HCT VFR BLD CALC: 41.3 % (ref 35.4–49)
HGB BLD-MCNC: 13.5 GM/DL (ref 11.7–16.9)
MCH RBC QN AUTO: 30.2 PG (ref 25.7–33.7)
MCHC RBC AUTO-ENTMCNC: 32.6 G/DL (ref 32–35.9)
MCV RBC: 92.8 FL (ref 80–96)
PLATELET # BLD AUTO: 313 K/MM3 (ref 134–434)
PMV BLD: 8.5 FL (ref 7.5–11.1)
POTASSIUM SERPLBLD-SCNC: 4.1 MMOL/L (ref 3.5–5.1)
PROT SERPL-MCNC: 7.4 G/DL (ref 6.4–8.2)
RBC # BLD AUTO: 4.46 M/MM3 (ref 4–5.6)
SODIUM SERPL-SCNC: 141 MMOL/L (ref 136–145)
WBC # BLD AUTO: 15.4 K/MM3 (ref 4–10)

## 2018-01-09 RX ADMIN — CEFEPIME HYDROCHLORIDE SCH MLS/HR: 2 INJECTION, POWDER, FOR SOLUTION INTRAVENOUS at 02:07

## 2018-01-09 RX ADMIN — SODIUM CHLORIDE SCH MLS/HR: 9 INJECTION, SOLUTION INTRAVENOUS at 21:52

## 2018-01-09 RX ADMIN — CEFEPIME HYDROCHLORIDE SCH MLS/HR: 2 INJECTION, POWDER, FOR SOLUTION INTRAVENOUS at 17:35

## 2018-01-09 RX ADMIN — SODIUM CHLORIDE SCH MLS/HR: 9 INJECTION, SOLUTION INTRAVENOUS at 06:36

## 2018-01-09 RX ADMIN — CEFEPIME HYDROCHLORIDE SCH MLS/HR: 2 INJECTION, POWDER, FOR SOLUTION INTRAVENOUS at 10:39

## 2018-01-09 NOTE — PN
Teaching Attending Note


Name of Resident: Nancy iKng





ATTENDING PHYSICIAN STATEMENT





I saw and evaluated the patient.


I reviewed the resident's note and discussed the case with the resident.


I agree with the resident's findings and plan as documented.








SUBJECTIVE:


Patient is lying in bed comfortably





OBJECTIVE:





 Vital Signs











Temperature  98.9 F   01/09/18 06:04


 


Pulse Rate  77   01/09/18 06:04


 


Respiratory Rate  20   01/09/18 06:04


 


Blood Pressure  108/80   01/09/18 06:04


 


O2 Sat by Pulse Oximetry (%)  97   01/08/18 20:20








 CBCD











WBC  15.4 K/mm3 (4.0-10.0)  H  01/09/18  06:00    


 


RBC  4.46 M/mm3 (4.00-5.60)   01/09/18  06:00    


 


Hgb  13.5 GM/dL (11.7-16.9)   01/09/18  06:00    


 


Hct  41.3 % (35.4-49)   01/09/18  06:00    


 


MCV  92.8 fl (80-96)   01/09/18  06:00    


 


MCHC  32.6 g/dl (32.0-35.9)   01/09/18  06:00    


 


RDW  12.6 % (11.9-15.9)   01/09/18  06:00    


 


Plt Count  313 K/MM3 (134-434)   01/09/18  06:00    


 


MPV  8.5 fl (7.5-11.1)   01/09/18  06:00    








 CMP











Sodium  141 mmol/L (136-145)   01/09/18  06:00    


 


Potassium  4.1 mmol/L (3.5-5.1)   01/09/18  06:00    


 


Chloride  106 mmol/L ()   01/09/18  06:00    


 


Carbon Dioxide  25 mmol/L (21-32)   01/09/18  06:00    


 


Anion Gap  10  (8-16)   01/09/18  06:00    


 


BUN  11 mg/dL (7-18)   01/09/18  06:00    


 


Creatinine  1.1 mg/dL (0.7-1.3)   01/09/18  06:00    


 


Creat Clearance w eGFR  > 60  (>60)   01/09/18  06:00    


 


Random Glucose  82 mg/dL ()   01/09/18  06:00    


 


Calcium  8.8 mg/dL (8.5-10.1)   01/09/18  06:00    


 


Total Bilirubin  1.2 mg/dL (0.2-1.0)  H  01/09/18  06:00    


 


AST  16 U/L (15-37)  D 01/09/18  06:00    


 


ALT  27 U/L (12-78)  D 01/09/18  06:00    


 


Alkaline Phosphatase  60 U/L ()   01/09/18  06:00    


 


Total Protein  7.4 g/dl (6.4-8.2)   01/09/18  06:00    


 


Albumin  3.4 g/dl (3.4-5.0)   01/09/18  06:00    








 Current Medications











Generic Name Dose Route Start Last Admin





  Trade Name Freq  PRN Reason Stop Dose Admin


 


Acetaminophen  650 mg  01/08/18 02:33  01/08/18 17:34





  Tylenol -  PO   650 mg





  Q4H PRN   Administration





  FEVER OR PAIN   


 


Sodium Chloride  1,000 mls @ 100 mls/hr  01/08/18 02:45  01/09/18 06:36





  Normal Saline -  IV   100 mls/hr





  ASDIR NIKI   Administration


 


Cefepime HCl 2 gm/ Dextrose  100 mls @ 200 mls/hr  01/08/18 11:45  01/09/18 10:

39





  IVPB   200 mls/hr





  Q8H-IV NIKI   Administration








 Home Medications











 Medication  Instructions  Recorded


 


Doxycycline Hyclate 100 mg PO BID 01/08/18











 Microbiology





01/07/18 20:00   Urine - Urine Clean Catch   Urine Culture - Final


                            NO GROWTH OBTAINED


01/07/18 21:00   Blood - Peripheral Venous   Blood Culture - Preliminary


                            NO GROWTH OBTAINED AFTER 24 HOURS, INCUBATION TO 

CONTINUE


                            FOR 4 DAYS.


01/07/18 20:10   Blood - Peripheral Venous   Blood Culture - Preliminary


                            NO GROWTH OBTAINED AFTER 24 HOURS, INCUBATION TO 

CONTINUE


                            FOR 4 DAYS.








IMAGING:


CT A/P w/o contrast 1/7/18: The visualized lung base appears unremarkable and 

the heart is within normal limits in size. There is mild fluid distention of 

the distal esophagus. Evaluation of the liver, spleen, pancreas, gallbladder, 

both adrenal glands and both kidneys appear unremarkable. Both kidneys appear 

unremarkable without evidence of hydroureteronephrosis, renal or ureteral stone 

, bilaterally. The stomach is partially distended without wall thickening. 

There is no evidence of small bowel obstruction. Normal-appearing terminal 

ileum. Nonvisualization of the appendix. Moderate amount of fecal residue in 

the colon without wall thickening. Partially distended urinary bladder without 

wall thickening. Normal size prostate gland. Perirectal and pericecal fat are 

clear. There is moderate to marked degenerative disc disease at L5-S1 level 

with bilateral spondylolysis and moderate bilateral facet hypertrophy. Mild 

broad-based disc bulge is slightly narrowing the foramina reaching and probably 

slightly impinging right L5 nerve root IMPRESSION: Nonvisualization of the 

appendix. No CT evidence of an acute process in the abdomen and pelvis. 

Partially distended urinary bladder limiting evaluation of its wall without 

gross thickening. Moderately severe degenerative disc disease at L5-S1 level 

with irregularity of the corresponding endplates, bilateral spondylolysis and 

mild disc bulge likely impinging right L5 nerve root.





Scrotal ultrasound 1/7/18: The right testicle measures 5 x 2.6 cm with a 

homogeneous echotexture and normal vascular flow. The right epididymal head, 

body and tail measure 7, 4 and 7 mm, respectively. Left testicle measures 4.5 x 

3.6 cm with homogeneous echotexture. Arterial and venous flow was documented. 

The left epididymal head, body and tail measure 7, 7 and 12 mm, respectively. 

There is a small amount of fluid/hydrocele on the left with septations. On the 

color Doppler images, there is prominent vascular flow in the left testicle and 

epididymis relative to the right. There is no evidence of a varicocele 

IMPRESSION: Hypervascular left testicle and prominent hypervascular left 

epididymis when compared to the right consistent with left epididymoorchitis. A 

small left hydrocele with thin septations is present 








ASSESSMENT/PLAN:


33yo man with PMH of recurrent UTIs who p/w 1x day of L testicular pain and 

swelling one day after completing a course of Cipro and doxy for UTI diagnosed 

on 12/27 now with L epididymitis





# Acute Sepsis due to L epididymitis - WBC 13.1, fever 100.4 on admission will 

monitor  scotal U/S e/o L epididymitis/ small septated hydrococele


[facility] Records in patient's chart: 12/27/17 UCx grew Serratia marescens, 

sensitive to Cefepime


-ID consulted.Started on Cefepime 2gm IVPB q8h but no imrovemet so far


-f/u Urine cultures


-f/u GC/Chlamydia and Quantiferon 


- consulted


-tylenol prn for pain control 





# Acute Left epididymitis on IV antibiotics 





#DVT PPX - SCDs

## 2018-01-09 NOTE — PN
Physical Exam: 


SUBJECTIVE: Patient seen and examined. Continues to have L scrotal swelling and 

pain. Feels some improvement with scrotal support. No fever or chills 

overnight. No dysuria or hematuria.








OBJECTIVE:





 Vital Signs











 Period  Temp  Pulse  Resp  BP Sys/Jeffers  Pulse Ox


 


 Last 24 Hr  98.6 F-98.9 F  77-90  16-20  /53-80  97











GENERAL: nad, aaox3


EYES: sclera anicteric, conjunctiva clear 


ENT: oropharynx clear without exudates, MMM


NECK: supple, no cervial LAD


LUNGS: CTAB


HEART: rrr, normal s1/s2


ABDOMEN: Soft, ntnd, 


LOWER EXTREMITIES: 2+ DP pulses, wwp, no edema


: unchanged, enlarged and tender L epididymis with erythema, no penile 

discharge





 CBC, BMP





 01/09/18 06:00 





 01/09/18 06:00 





 Hepatic Panel











Total Bilirubin  1.2 mg/dL (0.2-1.0)  H  01/09/18  06:00    


 


AST  16 U/L (15-37)  D 01/09/18  06:00    


 


ALT  27 U/L (12-78)  D 01/09/18  06:00    


 


Alkaline Phosphatase  60 U/L ()   01/09/18  06:00    


 


Albumin  3.4 g/dl (3.4-5.0)   01/09/18  06:00    








 Microbiology





01/07/18 20:00   Urine - Urine Clean Catch   Urine Culture - Final


                            NO GROWTH OBTAINED


01/07/18 21:00   Blood - Peripheral Venous   Blood Culture - Preliminary


                            NO GROWTH OBTAINED AFTER 24 HOURS, INCUBATION TO 

CONTINUE


                            FOR 4 DAYS.


01/07/18 20:10   Blood - Peripheral Venous   Blood Culture - Preliminary


                            NO GROWTH OBTAINED AFTER 24 HOURS, INCUBATION TO 

CONTINUE


                            FOR 4 DAYS.


Active Medications





Acetaminophen (Tylenol -)  650 mg PO Q4H PRN


   PRN Reason: FEVER OR PAIN


   Last Admin: 01/08/18 17:34 Dose:  650 mg


Sodium Chloride (Normal Saline -)  1,000 mls @ 100 mls/hr IV ASDIR NIKI


   Last Admin: 01/09/18 06:36 Dose:  100 mls/hr


Cefepime HCl 2 gm/ Dextrose  100 mls @ 200 mls/hr IVPB Q8H-IV NIKI


   Last Admin: 01/09/18 10:39 Dose:  200 mls/hr


Lactobacillus Acidophilus (Bacid -)  1 tab PO DAILY NIKI





ASSESSMENT/PLAN:


31yo man with PMH of recurrent UTIs who p/w 1x day of L testicular pain and 

swelling 24h after completing a course of Cipro and Doxy for UTI diagnosed on 12 /27/17 (+Serratia marescens, see paper chart for sensitivities) now with sepsis 

2/2 L epididymitis (WBC 13.1, T100.4 on admission).





#Sepsis 2/2 L epididymitis, WBC increased today


Scrotal U/S e/o L epididymitis and small septated hydrococele


-ID consulted. 12/27 Cx sensitive to Cefepime - c/w Cefepime 2gm q8h, Day 2


-f/u GC/Chlamydia, Quantiferon, Urine AFB culture


-f/u ESR, CRP, KATHY for inflammatory markers


- consulted - offered vasectomy as O/P


-tylenol prn for pain control 





#DVT PPX - SCDs





#FEN: NS 100cc/hr / lytes wnl / Regular diet





#DISPO: continue M/S


FULL code





d/w Dr. Adriane King MD


PGY1 - Internal Medicine











Visit type





- Emergency Visit


Emergency Visit: No





- New Patient


This patient is new to me today: No





- Critical Care


Critical Care patient: No

## 2018-01-09 NOTE — PN
Progress Note (short form)





- Note


Progress Note: 





continued scrotal pain and swelling


no fevers


stilll alot of pain





 Vital Signs











 Period  Temp  Pulse  Resp  BP Sys/Jeffers  Pulse Ox


 


 Last 24 Hr  98.4 F-99.2 F  77-98  16-20  /53-80  97








cor-rrr


lungs clear


abd soft,nt


scrotal swelling with erythema pain on palpation


ext no edema





 CBC, BMP





 01/09/18 06:00 





 01/09/18 06:00 








cultures pending


 Current Medications





Acetaminophen (Tylenol -)  650 mg PO Q4H PRN


   PRN Reason: FEVER OR PAIN


   Last Admin: 01/08/18 17:34 Dose:  650 mg


Sodium Chloride (Normal Saline -)  1,000 mls @ 100 mls/hr IV ASDIR NIKI


   Last Admin: 01/09/18 06:36 Dose:  100 mls/hr


Cefepime HCl 2 gm/ Dextrose  100 mls @ 200 mls/hr IVPB Q8H-IV NIKI


   Last Admin: 01/09/18 10:39 Dose:  200 mls/hr








a/p





left epididymitis- awaiting cultures


continue cefepime

## 2018-01-10 LAB
ANION GAP SERPL CALC-SCNC: 9 MMOL/L (ref 8–16)
BUN SERPL-MCNC: 11 MG/DL (ref 7–18)
CALCIUM SERPL-MCNC: 9 MG/DL (ref 8.5–10.1)
CHLORIDE SERPL-SCNC: 106 MMOL/L (ref 98–107)
CO2 SERPL-SCNC: 27 MMOL/L (ref 21–32)
CREAT SERPL-MCNC: 1.1 MG/DL (ref 0.7–1.3)
DEPRECATED RDW RBC AUTO: 12.8 % (ref 11.9–15.9)
GLUCOSE SERPL-MCNC: 77 MG/DL (ref 74–106)
HCT VFR BLD CALC: 40.6 % (ref 35.4–49)
HGB BLD-MCNC: 13.1 GM/DL (ref 11.7–16.9)
MCH RBC QN AUTO: 30.3 PG (ref 25.7–33.7)
MCHC RBC AUTO-ENTMCNC: 32.4 G/DL (ref 32–35.9)
MCV RBC: 93.6 FL (ref 80–96)
PLATELET # BLD AUTO: 312 K/MM3 (ref 134–434)
PMV BLD: 8.6 FL (ref 7.5–11.1)
POTASSIUM SERPLBLD-SCNC: 4.5 MMOL/L (ref 3.5–5.1)
RBC # BLD AUTO: 4.34 M/MM3 (ref 4–5.6)
SODIUM SERPL-SCNC: 142 MMOL/L (ref 136–145)
WBC # BLD AUTO: 10.9 K/MM3 (ref 4–10)

## 2018-01-10 RX ADMIN — CEFEPIME HYDROCHLORIDE SCH MLS/HR: 2 INJECTION, POWDER, FOR SOLUTION INTRAVENOUS at 17:19

## 2018-01-10 RX ADMIN — CEFEPIME HYDROCHLORIDE SCH MLS/HR: 2 INJECTION, POWDER, FOR SOLUTION INTRAVENOUS at 10:25

## 2018-01-10 RX ADMIN — ACETAMINOPHEN PRN MG: 325 TABLET ORAL at 10:25

## 2018-01-10 RX ADMIN — Medication SCH TAB: at 10:24

## 2018-01-10 RX ADMIN — SODIUM CHLORIDE SCH MLS/HR: 9 INJECTION, SOLUTION INTRAVENOUS at 10:23

## 2018-01-10 RX ADMIN — SODIUM CHLORIDE SCH: 9 INJECTION, SOLUTION INTRAVENOUS at 10:24

## 2018-01-10 RX ADMIN — CEFEPIME HYDROCHLORIDE SCH MLS/HR: 2 INJECTION, POWDER, FOR SOLUTION INTRAVENOUS at 01:36

## 2018-01-10 NOTE — PN
Progress Note (short form)





- Note


Progress Note: 





improved, less pain , less swelling


a bit more comfortable








 


 Vital Signs











 Period  Temp  Pulse  Resp  BP Sys/Jeffers  Pulse Ox


 


 Last 24 Hr  98.2 F-99.4 F  68-78  18-20  110-116/63-84  98








cor-rrr


lungs clear


abd soft, nt


ext no edema


less swelling left scrotum +erythema, +induration





 CBC, BMP





 01/10/18 06:30 





 01/10/18 06:30 





 Microbiology





01/07/18 21:00   Blood - Peripheral Venous   Blood Culture - Preliminary


                            NO GROWTH OBTAINED AFTER 48 HOURS, INCUBATION TO 

CONTINUE


                            FOR 3 DAYS.


01/07/18 20:10   Blood - Peripheral Venous   Blood Culture - Preliminary


                            NO GROWTH OBTAINED AFTER 48 HOURS, INCUBATION TO 

CONTINUE


                            FOR 3 DAYS.


01/07/18 20:00   Urine - Urine Clean Catch   Urine Culture - Final


                            NO GROWTH OBTAINED





chlamydia/gc NAAT pending








a/p





left epididymitis- cultures negative (recent cipro/doxy) 


continue cefepime


f/u quant, send urine afb


hopefully switch to po abx soon

## 2018-01-10 NOTE — PN
Teaching Attending Note


Name of Resident: Nancy King





ATTENDING PHYSICIAN STATEMENT





I saw and evaluated the patient.


I reviewed the resident's note and discussed the case with the resident.


I agree with the resident's findings and plan as documented.








SUBJECTIVE:





pain in L nayeli-scrotum 





OBJECTIVE:


NAd


CV : RRR


Lung s: CTAB Ext : no edema 


Abd: soft, NT< ND , NL BS 


: L hemiscrotum with erythematous , tender skin, enlarged L testicle, 

enlarged tender epididymis. NO inguinal LAP  





ASSESSMENT AND PLAN:


31 y/o man withh/o recurrent UTIs, who presented with L scrotal pain and was 

found to have L epididymo-orchitis 





 1-[id number]: 


- cont cefepime 


- follow Quantiferone gold 


-  follow AFB in urine 


- scrotal elevation 


- dc IVF 


ambulatory

## 2018-01-10 NOTE — PN
Physical Exam: 


SUBJECTIVE: Patient seen and examined. No fever or chills. Continues to have L 

scrotal pain and swelling. No dysuria or hematuria.








OBJECTIVE:





 Vital Signs











 Period  Temp  Pulse  Resp  BP Sys/Jeffers  Pulse Ox


 


 Last 24 Hr  98.2 F-99.4 F  68-78  18-20  110-116/63-68  











GENERAL: lying comfortably in bed, nad, aaox3


EYES: sclera anicteric, conjunctiva clear 


ENT: oropharynx clear without exudates, MMM


NECK: supple, no cervical LAD


LUNGS: CTAB


HEART: rrr, normal s1/s2


ABDOMEN: Soft, ntnd, 


LOWER EXTREMITIES: 2+ DP pulses, wwp, no edema


: L scrotum twice size of R, tender L epididymis with erythema,+induration





 CBC, BMP





 01/10/18 06:30 





 01/10/18 06:30 





 Hepatic Panel











Total Bilirubin  1.2 mg/dL (0.2-1.0)  H  01/09/18  06:00    


 


AST  16 U/L (15-37)  D 01/09/18  06:00    


 


ALT  27 U/L (12-78)  D 01/09/18  06:00    


 


Alkaline Phosphatase  60 U/L ()   01/09/18  06:00    


 


Albumin  3.4 g/dl (3.4-5.0)   01/09/18  06:00    








 Microbiology





01/07/18 21:00   Blood - Peripheral Venous   Blood Culture - Preliminary


                            NO GROWTH OBTAINED AFTER 72 HOURS, INCUBATION TO 

CONTINUE


                            FOR 2 DAYS.


01/07/18 20:10   Blood - Peripheral Venous   Blood Culture - Preliminary


                            NO GROWTH OBTAINED AFTER 72 HOURS, INCUBATION TO 

CONTINUE


                            FOR 2 DAYS.


01/07/18 20:00   Urine - Urine Clean Catch   Urine Culture - Final


                            NO GROWTH OBTAINED





Active Medications





Acetaminophen (Tylenol -)  650 mg PO Q4H PRN


   PRN Reason: FEVER OR PAIN


   Last Admin: 01/10/18 10:25 Dose:  650 mg


Cefepime HCl 2 gm/ Dextrose  100 mls @ 200 mls/hr IVPB Q8H-IV NIKI


   Last Admin: 01/10/18 17:19 Dose:  200 mls/hr


Lactobacillus Acidophilus (Bacid -)  1 tab PO DAILY NIKI


   Last Admin: 01/10/18 10:24 Dose:  1 tab





ASSESSMENT/PLAN:


31yo man with PMH of recurrent UTIs who p/w 1x day of L testicular pain and 

swelling 24h after completing a course of Cipro and Doxy for UTI diagnosed on 12 /27/17 (+Serratia marescens, see paper chart for sensitivities) now with sepsis 

2/2 L epididymitis (WBC 13.1, T100.4 on admission).





#L epididymitis, patient no longer septic, leukocytosis improved today


Scrotal U/S e/o L epididymitis and small septated hydrococele


-ID consulted. c/w Cefepime 2gm q8h, Day 3


-GC and Chlamydia negatie; Quantiferon and Urine AFB culture pending


-f/u ESR, CRP, KATHY for inflammatory markers


- consulted - offered vasectomy as O/P


-tylenol prn for pain control 





#DVT PPX - encourage amulation





#FEN: PO hydration / lytes wnl / Regular diet





#DISPO: continue M/S


FULL code





d/w Dr. Mirna King MD


PGY1 - Internal Medicine








Visit type





- Emergency Visit


Emergency Visit: No





- New Patient


This patient is new to me today: No





- Critical Care


Critical Care patient: No

## 2018-01-11 VITALS — DIASTOLIC BLOOD PRESSURE: 67 MMHG | SYSTOLIC BLOOD PRESSURE: 114 MMHG | TEMPERATURE: 98.7 F | HEART RATE: 75 BPM

## 2018-01-11 LAB
ALBUMIN SERPL-MCNC: 3.1 G/DL (ref 3.4–5)
ALP SERPL-CCNC: 54 U/L (ref 45–117)
ALT SERPL-CCNC: 27 U/L (ref 12–78)
ANION GAP SERPL CALC-SCNC: 6 MMOL/L (ref 8–16)
AST SERPL-CCNC: 14 U/L (ref 15–37)
BASOPHILS # BLD: 0.6 % (ref 0–2)
BILIRUB SERPL-MCNC: 0.8 MG/DL (ref 0.2–1)
BUN SERPL-MCNC: 12 MG/DL (ref 7–18)
CALCIUM SERPL-MCNC: 8.7 MG/DL (ref 8.5–10.1)
CHLORIDE SERPL-SCNC: 107 MMOL/L (ref 98–107)
CO2 SERPL-SCNC: 27 MMOL/L (ref 21–32)
CREAT SERPL-MCNC: 1.1 MG/DL (ref 0.7–1.3)
DEPRECATED RDW RBC AUTO: 12.3 % (ref 11.9–15.9)
EOSINOPHIL # BLD: 2.1 % (ref 0–4.5)
GLUCOSE SERPL-MCNC: 86 MG/DL (ref 74–106)
HCT VFR BLD CALC: 41.4 % (ref 35.4–49)
HGB BLD-MCNC: 13.6 GM/DL (ref 11.7–16.9)
LYMPHOCYTES # BLD: 26.6 % (ref 8–40)
MCH RBC QN AUTO: 30.2 PG (ref 25.7–33.7)
MCHC RBC AUTO-ENTMCNC: 32.7 G/DL (ref 32–35.9)
MCV RBC: 92.3 FL (ref 80–96)
MONOCYTES # BLD AUTO: 10.1 % (ref 3.8–10.2)
NEUTROPHILS # BLD: 60.6 % (ref 42.8–82.8)
PLATELET # BLD AUTO: 377 K/MM3 (ref 134–434)
PMV BLD: 8.3 FL (ref 7.5–11.1)
POTASSIUM SERPLBLD-SCNC: 4.4 MMOL/L (ref 3.5–5.1)
PROT SERPL-MCNC: 6.9 G/DL (ref 6.4–8.2)
RBC # BLD AUTO: 4.48 M/MM3 (ref 4–5.6)
SODIUM SERPL-SCNC: 140 MMOL/L (ref 136–145)
WBC # BLD AUTO: 9 K/MM3 (ref 4–10)

## 2018-01-11 RX ADMIN — CEFEPIME HYDROCHLORIDE SCH MLS/HR: 2 INJECTION, POWDER, FOR SOLUTION INTRAVENOUS at 01:13

## 2018-01-11 RX ADMIN — Medication SCH TAB: at 09:55

## 2018-01-11 RX ADMIN — CEFEPIME HYDROCHLORIDE SCH MLS/HR: 2 INJECTION, POWDER, FOR SOLUTION INTRAVENOUS at 09:55

## 2018-01-11 NOTE — PN
Progress Note, Physician


History of Present Illness: 





Reports less L scrotal pain


No voiding difficulty. No dysuria.


No fever/ chills


Cultures negative





- Current Medication List


Current Medications: 


Active Medications





Acetaminophen (Tylenol -)  650 mg PO Q4H PRN


   PRN Reason: FEVER OR PAIN


   Last Admin: 01/10/18 10:25 Dose:  650 mg


Cefepime HCl 2 gm/ Dextrose  100 mls @ 200 mls/hr IVPB Q8H-IV NIKI


   Last Admin: 01/11/18 09:55 Dose:  200 mls/hr


Lactobacillus Acidophilus (Bacid -)  1 tab PO DAILY NIKI


   Last Admin: 01/11/18 09:55 Dose:  1 tab











- Objective


Vital Signs: 


 Vital Signs











Temperature  98.2 F   01/11/18 09:50


 


Pulse Rate  73   01/11/18 09:50


 


Respiratory Rate  20   01/11/18 09:50


 


Blood Pressure  120/66   01/11/18 09:50


 


O2 Sat by Pulse Oximetry (%)  96   01/11/18 10:00











Constitutional: Yes: No Distress


Cardiovascular: Yes: Regular Rate and Rhythm, S1, S2


Respiratory: Yes: CTA Bilaterally


Gastrointestinal: Yes: Normal Bowel Sounds, Soft.  No: Tenderness


Genitourinary: Yes: Other (Less L epididymal swelling and tenderness)


Labs: 


 CBC, BMP





 01/10/18 06:30 





 01/11/18 06:30 











Assessment/Plan





L epididymitis


+ Urine c/s Serratia (outpatient)





Clinically improved on cefepime


May substitute po Vantin (cefpodoxime)


      100mg bid x 10d


Outpatient urology follow up

## 2018-01-11 NOTE — PN
Teaching Attending Note


Name of Resident: Nancy King





ATTENDING PHYSICIAN STATEMENT





I saw and evaluated the patient.


I reviewed the resident's note and discussed the case with the resident.


I agree with the resident's findings and plan as documented.








SUBJECTIVE:


No fever or chills. L testicle feels better . 





OBJECTIVE:


NAd


CV : RRR


Lungs: CTAB Ext : no edema  


: L hemiscrotum with less  erythematous , tender skin, enlarged L testicle, 

enlarged tender epididymis. NO inguinal LAP  





ASSESSMENT AND PLAN:





31 y/o man with h/o recurrent UTIs, who presented with L scrotal pain and was 

found to have L epididymo-orchitis 





 2-Rbhzfoagy-qggohfnm: no fever , leukocytosis resolved .sx improved  


- serratia grew in urine as out pt , sensitivity reviewed, sensitive to 

ceftriaxone---> vantin as out pt x 10 more days. will give one dose before he 

leaves ( last dose of cefepime 9 am today )  


 follow Quantiferone gold Neg  


-  AFB in urine pending 


- f/u with uro and ID and PCP as outpt 


- CBC as outpt 





-plan d/w him .

## 2018-01-11 NOTE — PN
Physical Exam: 


SUBJECTIVE: Patient seen and examined








OBJECTIVE:





 Vital Signs











 Period  Temp  Pulse  Resp  BP Sys/Jeffers  Pulse Ox


 


 Last 24 Hr  98.0 F-98.9 F  68-84  18-20  105-117/56-84  98











GENERAL: The patient is awake, alert, and fully oriented, in no acute distress.


HEAD: Normal with no signs of trauma.


EYES: PERRL, extraocular movements intact, sclera anicteric, conjunctiva clear. 

No ptosis. 


ENT: Ears normal, nares patent, oropharynx clear without exudates, moist mucous 


membranes.


NECK: Trachea midline, full range of motion, supple. 


LUNGS: Breath sounds equal, clear to auscultation bilaterally, no wheezes, no 

crackles, no 


accessory muscle use. 


HEART: Regular rate and rhythm, S1, S2 without murmur, rub or gallop.


ABDOMEN: Soft, nontender, nondistended, normoactive bowel sounds, no guarding, 

no 


rebound, no hepatosplenomegaly, no masses.


EXTREMITIES: 2+ pulses, warm, well-perfused, no edema. 


NEUROLOGICAL: Cranial nerves II through XII grossly intact. Normal speech, gait 

not 


observed.


PSYCH: Normal mood, normal affect.


SKIN: Warm, dry, normal turgor, no rashes or lesions noted














 Laboratory Results - last 24 hr











  01/08/18 01/10/18 01/10/18





  05:25 06:30 06:30


 


WBC   


 


RBC   


 


Hgb   


 


Hct   


 


MCV   


 


MCH   


 


MCHC   


 


RDW   


 


Plt Count   


 


MPV   


 


ESR    51 H


 


Sodium   142 


 


Potassium   4.5 


 


Chloride   106 


 


Carbon Dioxide   27 


 


Anion Gap   9 


 


BUN   11 


 


Creatinine   1.1 


 


Random Glucose   77 


 


Calcium   9.0 


 


C-Reactive Protein   9.2 H 


 


C.trachomatis Ampl DNA  Negative  


 


N. gonorrhoeae (SETH)  Negative  














  01/10/18





  06:30


 


WBC  10.9 H


 


RBC  4.34


 


Hgb  13.1


 


Hct  40.6


 


MCV  93.6


 


MCH  30.3


 


MCHC  32.4


 


RDW  12.8


 


Plt Count  312


 


MPV  8.6


 


ESR 


 


Sodium 


 


Potassium 


 


Chloride 


 


Carbon Dioxide 


 


Anion Gap 


 


BUN 


 


Creatinine 


 


Random Glucose 


 


Calcium 


 


C-Reactive Protein 


 


C.trachomatis Ampl DNA 


 


N. gonorrhoeae (SETH) 








Active Medications











Generic Name Dose Route Start Last Admin





  Trade Name Freq  PRN Reason Stop Dose Admin


 


Acetaminophen  650 mg  01/08/18 02:33  01/10/18 10:25





  Tylenol -  PO   650 mg





  Q4H PRN   Administration





  FEVER OR PAIN   


 


Cefepime HCl 2 gm/ Dextrose  100 mls @ 200 mls/hr  01/08/18 11:45  01/11/18 01:

13





  IVPB   200 mls/hr





  Q8H-IV NIKI   Administration


 


Lactobacillus Acidophilus  1 tab  01/10/18 10:00  01/10/18 10:24





  Bacid -  PO   1 tab





  DAILY NIKI   Administration











ASSESSMENT/PLAN: